# Patient Record
Sex: FEMALE | Race: WHITE | NOT HISPANIC OR LATINO | Employment: OTHER | URBAN - METROPOLITAN AREA
[De-identification: names, ages, dates, MRNs, and addresses within clinical notes are randomized per-mention and may not be internally consistent; named-entity substitution may affect disease eponyms.]

---

## 2021-06-23 ENCOUNTER — OFFICE VISIT (OUTPATIENT)
Dept: ORTHOPEDIC SURGERY | Facility: CLINIC | Age: 56
End: 2021-06-23

## 2021-06-23 VITALS
HEIGHT: 68 IN | SYSTOLIC BLOOD PRESSURE: 134 MMHG | WEIGHT: 203.2 LBS | BODY MASS INDEX: 30.8 KG/M2 | HEART RATE: 64 BPM | DIASTOLIC BLOOD PRESSURE: 88 MMHG

## 2021-06-23 DIAGNOSIS — I87.8 VENOUS STASIS: ICD-10-CM

## 2021-06-23 DIAGNOSIS — M25.571 RIGHT ANKLE PAIN, UNSPECIFIED CHRONICITY: Primary | ICD-10-CM

## 2021-06-23 PROCEDURE — 99203 OFFICE O/P NEW LOW 30 MIN: CPT | Performed by: ORTHOPAEDIC SURGERY

## 2021-06-23 RX ORDER — CITALOPRAM 20 MG/1
20 TABLET ORAL DAILY
COMMUNITY
Start: 2021-06-10

## 2021-06-23 RX ORDER — MULTIPLE VITAMINS W/ MINERALS TAB 9MG-400MCG
TAB ORAL
COMMUNITY

## 2021-06-23 RX ORDER — BISOPROLOL FUMARATE AND HYDROCHLOROTHIAZIDE 10; 6.25 MG/1; MG/1
1 TABLET ORAL DAILY
COMMUNITY
Start: 2021-06-10

## 2021-06-23 RX ORDER — PHENOL 1.4 %
10 AEROSOL, SPRAY (ML) MUCOUS MEMBRANE DAILY
COMMUNITY

## 2021-06-23 RX ORDER — ALENDRONATE SODIUM 70 MG/1
TABLET ORAL
COMMUNITY

## 2021-06-23 RX ORDER — LEVOTHYROXINE SODIUM 137 UG/1
TABLET ORAL
COMMUNITY

## 2021-06-23 RX ORDER — SELENIUM 50 MCG
TABLET ORAL
COMMUNITY

## 2021-06-23 NOTE — PROGRESS NOTES
NEW PATIENT    Patient: Megan Morel  : 1965    Primary Care Provider: Mehrdad Menezes MD    Requesting Provider: As above    Pain of the Right Ankle      History    Chief Complaint: Right ankle pain    History of Present Illness: This is a pleasant 56-year-old woman here today with her .  In late 2020 she had an inversion injury to the right ankle.  There is an x-ray report in the system but no films dated 2020, small avulsion fracture at the distal aspect of the fibula.  She was seen by podiatry.  She was placed in a boot.  She was followed and noted that the small fragment never healed with solid bone.  She was given a bone stimulator.  She had improvement in her pain but never completely resolved.  She was evaluated for osteoporosis.  She is now on medication.  At her last visit surgery was recommended.  I reviewed the copious outside records.  Plating and bone graft from the calcaneus were recommended.  She has some of the prior x-rays and pictures on her phone, they show a very thin very distal avulsion fracture at the tip of the fibula.  She reports that she asked for physical therapy recently and has started.  She reports much less pain than the initial injury.  She is walking and rates the pain as 4 out of 10.  She has a history of some difficulty with balance.  She reports physical therapy is helping.    Current Outpatient Medications on File Prior to Visit   Medication Sig Dispense Refill   • alendronate (FOSAMAX) 70 MG tablet alendronate 70 mg tablet   TAKE 1 TABLET BY MOUTH 1 TIME A WEEK 30 MINUTES BEFORE THE FIRST FOOD OR BEVERAGE OR MEDICINE OF THE DAY WITH WATER     • AMLODIPINE BESYLATE PO Take 5 mg by mouth.     • B Complex Vitamins (VITAMIN-B COMPLEX PO) Take 1 tablet by mouth Daily.     • bisoprolol-hydrochlorothiazide (ZIAC) 10-6.25 MG per tablet Take 1 tablet by mouth Daily.     • calcium carb-cholecalciferol (Caltrate 600+D3) 600-800 MG-UNIT tablet      •  citalopram (CeleXA) 20 MG tablet Take 20 mg by mouth Daily.     • Lactobacillus (Acidophilus) capsule Acidophilus     • levothyroxine (SYNTHROID, LEVOTHROID) 137 MCG tablet levothyroxine 137 mcg tablet   TAKE 1 TABLET BY MOUTH EVERY MORNING     • Melatonin 10 MG tablet Take 10 mg by mouth Daily.     • multivitamin with minerals (MULTIVITAMIN ADULTS PO) multivitamin       No current facility-administered medications on file prior to visit.      Allergies   Allergen Reactions   • Penicillins Hives      Past Medical History:   Diagnosis Date   • Asthma    • Bronchitis    • Headache, migraine    • High blood pressure    • Hx of bladder infections    • Osteoporosis    • Pneumonia    • Skull fractures (CMS/HCC)    • TBI (traumatic brain injury) (CMS/HCC)    • Thyroid disease      Past Surgical History:   Procedure Laterality Date   • OTHER SURGICAL HISTORY      blood transfusion-TBI   • THYROID BIOPSY      06/21/2018     Family History   Problem Relation Age of Onset   • Arthritis Mother    • Asthma Mother    • Cancer Mother    • Hypertension Mother    • Thyroid disease Mother    • Asthma Father    • Hypertension Father    • Stroke Father    • Thyroid disease Father    • Thyroid disease Brother    • Cancer Maternal Grandmother       Social History     Socioeconomic History   • Marital status:      Spouse name: Not on file   • Number of children: Not on file   • Years of education: Not on file   • Highest education level: Not on file   Tobacco Use   • Smoking status: Never Smoker   • Smokeless tobacco: Never Used   Substance and Sexual Activity   • Alcohol use: Never   • Drug use: Never   • Sexual activity: Defer        Review of Systems   Constitutional: Positive for activity change and fatigue.   Eyes: Negative.    Respiratory: Positive for chest tightness.    Cardiovascular: Negative.    Gastrointestinal: Negative.    Endocrine: Negative.    Genitourinary: Negative.    Musculoskeletal: Positive for arthralgias,  "neck pain and neck stiffness.   Skin: Negative.    Allergic/Immunologic: Positive for environmental allergies.   Neurological: Positive for numbness and headaches.   Hematological: Negative.    Psychiatric/Behavioral: Negative.        The following portions of the patient's history were reviewed and updated as appropriate: allergies, current medications, past family history, past medical history, past social history, past surgical history and problem list.    Physical Exam:   /88   Pulse 64   Ht 172.7 cm (67.99\")   Wt 92.2 kg (203 lb 3.2 oz)   BMI 30.90 kg/m²   GENERAL: Body habitus: obese    Lower extremity edema: Right: 1+ pitting; Left: 1+ pitting    Varicose veins:  Right: mild; Left: mild    Gait: normal     Mental Status:  awake and alert; oriented to person, place, and time    Voice:  clear  SKIN:  Lower extremity: Normal    Hair Growth(lower extremity):  Right:normal; Left:  normal  NAILS: Toenails: normal  HEENT: Head: Normocephalic, atraumatic,  without obvious abnormality.  eye: normal external eye, no icterus  ears:normal external ears  PULM:  Repiratory effort normal  CV:  Dorsalis Pedis:  Right: 2+; Left:2+    Posterior Tibial: Right:2+; Left:2+    Capillary Refill:  Brisk  MSK:  Hand:right handed      Tibia:  Right:  non tender; Left:  non tender      Ankle:  Right: non tender, ROM  normal and motor function  normal; Left:  Very slightly tender at the tip of the fibula, tender over the ATFL, otherwise nontender, range of motion is normal, no joint effusion, no instability to anterior drawer      Foot:  Right:  non tender, normal range of motion; Left:  non tender, normal range of motion      NEURO: Heel Walking:  Right:  normal and A little unsteady; Left:  normal and Somewhat unsteady    Toe Walking:  Right:  able to get up on toes, difficulty with balance; Left:  able to get up on toes, difficulty with balance     Melcher [place] 5.07 monofilament test: normal    Lower extremity " sensation: intact     Reflexes:  Biceps:  Right:  not tested; Left:  not tested           Quads:  Right:  not tested; Left:  not tested           Ankle:  Right:  not tested; Left:  not tested      Calf Atrophy:Mild left calf atrophy    Motor Function: all 5/5         Medical Decision Making    Data Review:   ordered and reviewed x-rays today, reviewed radiology images, reviewed radiology results and reviewed outside records    Assessment and Plan/ Diagnosis/Treatment options:   1. Right ankle pain, unspecified chronicity  She has now a small smooth edged ossicle at the tip of the fibula consistent with the initial injury healing with fibrous tissue.  I explained that this is generally considered a normal outcome.  Many of the small avulsion fractures heal with fibrous tissue rather than solid bone.  This is not a Lemon a fracture.  This is essentially a significant ankle sprain.  I explained that when we sprained her ankle we either tear of the ligament or the ligament pulls off a small piece of bone.  It is extremely rare that this would ever need surgery.  If it did come to surgery bone grafting and plating would be completely ineffective.  One would need to remove the small bony ossicle and reattach the ligament with suture anchors.  However in general it is unnecessary to perform any type of surgery for this problem.  The vast majority of patients get better with physical therapy.  That is what I would recommend.  She has already started therapy and I will put in a new prescription indicating proprioceptive training.  She may discontinue the boot.  I will see her when therapy is complete.  No x-ray needed at that time.  - XR Ankle 2 View Right  - Ambulatory Referral to Physical Therapy    2. Venous stasis  .I explained edema and venous stasis to the patient, and the often hereditary nature of the problem, and the contribution of weight, trauma, etc. I explained how they cause edema (due to gravity, etc) I  explained how they can lead to ulceration.  I explained how they can cause pain, stiffness, aching, cramping, etc. I explained that it is a very very common problem, so common that even [employer] markets compression stockings.  I recommend wearing support stockings (compression stockings) daily, we discussed what type and where to find them                  Radiology Ordered []  Radiology Reports Reviewed []      Radiology Images Reviewed []   Labs Reviewed []    Labs Ordered []   PCP Records Reviewed []    Provider Records Reviewed []    ER Records Reviewed []    Hospital Records Reviewed []    History Obtained From Family []    Phone conversation with Provider []    Records Requested []        Ruthie Calixto MD

## 2021-09-27 ENCOUNTER — OFFICE VISIT (OUTPATIENT)
Dept: ORTHOPEDIC SURGERY | Facility: CLINIC | Age: 56
End: 2021-09-27

## 2021-09-27 VITALS
DIASTOLIC BLOOD PRESSURE: 90 MMHG | SYSTOLIC BLOOD PRESSURE: 164 MMHG | HEIGHT: 68 IN | WEIGHT: 202.6 LBS | BODY MASS INDEX: 30.71 KG/M2 | HEART RATE: 66 BPM

## 2021-09-27 DIAGNOSIS — M25.571 RIGHT ANKLE PAIN, UNSPECIFIED CHRONICITY: Primary | ICD-10-CM

## 2021-09-27 DIAGNOSIS — I87.8 VENOUS STASIS: ICD-10-CM

## 2021-09-27 PROCEDURE — 99213 OFFICE O/P EST LOW 20 MIN: CPT | Performed by: ORTHOPAEDIC SURGERY

## 2021-09-27 RX ORDER — CHOLECALCIFEROL (VITAMIN D3) 1250 MCG
CAPSULE ORAL
COMMUNITY
Start: 2010-06-01

## 2021-09-27 NOTE — PROGRESS NOTES
ESTABLISHED PATIENT    Patient: Megan Morel  : 1965    Primary Care Provider: Mehrdad Menezes MD    Requesting Provider: As above    Follow-up (3 months- Right ankle pain, unspecified chronicity )      History    Chief Complaint: Right ankle pain    History of Present Illness: She returns for follow-up of the small transverse avulsion at the tip of the right fibula.  She has been doing PT and is much improved.  They did proprioception.  She reports the pain is significantly less.  She is able to do more activities.  She brought a disc with her previous x-rays on it, I looked at the films dated 2021, 2021, 3/27/2021, 2021, and 2021.  They all show the small transverse tip of fibula avulsion fracture.  No widening of the mortise.  No other fractures.    Current Outpatient Medications on File Prior to Visit   Medication Sig Dispense Refill   • alendronate (FOSAMAX) 70 MG tablet alendronate 70 mg tablet   TAKE 1 TABLET BY MOUTH 1 TIME A WEEK 30 MINUTES BEFORE THE FIRST FOOD OR BEVERAGE OR MEDICINE OF THE DAY WITH WATER     • AMLODIPINE BESYLATE PO Take 5 mg by mouth.     • B Complex Vitamins (VITAMIN-B COMPLEX PO) Take 1 tablet by mouth Daily.     • bisoprolol-hydrochlorothiazide (ZIAC) 10-6.25 MG per tablet Take 1 tablet by mouth Daily.     • calcium carb-cholecalciferol (Caltrate 600+D3) 600-800 MG-UNIT tablet      • Cholecalciferol (Vitamin D3) 1.25 MG (07661 UT) capsule      • citalopram (CeleXA) 20 MG tablet Take 20 mg by mouth Daily.     • Lactobacillus (Acidophilus) capsule Acidophilus     • levothyroxine (SYNTHROID, LEVOTHROID) 137 MCG tablet levothyroxine 137 mcg tablet   TAKE 1 TABLET BY MOUTH EVERY MORNING     • Melatonin 10 MG tablet Take 10 mg by mouth Daily.     • multivitamin with minerals (MULTIVITAMIN ADULTS PO) multivitamin       No current facility-administered medications on file prior to visit.      Allergies   Allergen Reactions   • Penicillins Hives      Past  "Medical History:   Diagnosis Date   • Asthma    • Bronchitis    • Headache, migraine    • High blood pressure    • Hx of bladder infections    • Osteoporosis    • Pneumonia    • Skull fractures (CMS/HCC)    • TBI (traumatic brain injury) (CMS/HCC)    • Thyroid disease      Past Surgical History:   Procedure Laterality Date   • OTHER SURGICAL HISTORY      blood transfusion-TBI   • THYROID BIOPSY      06/21/2018     Family History   Problem Relation Age of Onset   • Arthritis Mother    • Asthma Mother    • Cancer Mother    • Hypertension Mother    • Thyroid disease Mother    • Asthma Father    • Hypertension Father    • Stroke Father    • Thyroid disease Father    • Thyroid disease Brother    • Cancer Maternal Grandmother       Social History     Socioeconomic History   • Marital status:      Spouse name: Not on file   • Number of children: Not on file   • Years of education: Not on file   • Highest education level: Not on file   Tobacco Use   • Smoking status: Never Smoker   • Smokeless tobacco: Never Used   Substance and Sexual Activity   • Alcohol use: Never   • Drug use: Never   • Sexual activity: Defer        Review of Systems   Constitutional: Negative.    HENT: Negative.    Eyes: Negative.    Respiratory: Negative.    Cardiovascular: Negative.    Gastrointestinal: Negative.    Endocrine: Negative.    Genitourinary: Negative.    Musculoskeletal: Positive for arthralgias.   Skin: Negative.    Allergic/Immunologic: Negative.    Neurological: Negative.    Hematological: Negative.    Psychiatric/Behavioral: Negative.        The following portions of the patient's history were reviewed and updated as appropriate: allergies, current medications, past family history, past medical history, past social history, past surgical history and problem list.    Physical Exam:   /90   Pulse 66   Ht 172.7 cm (67.99\")   Wt 91.9 kg (202 lb 9.6 oz)   BMI 30.81 kg/m²   GENERAL: Body habitus: obese    Lower extremity " edema: Left: trace; Right: trace    Gait: normal     Mental Status:  awake and alert; oriented to person, place, and time  MSK:  Tibia:  Right:  non tender; Left:  non tender        Ankle:  Right: Mildly tender over the ATFL but not tender over the fibula, ankle is stable, no tenderness, slight thickening around the right ankle compared with the left range of motion normal and symmetric; Left:  non tender        Foot:  Right:  non tender; Left:  non tender    NEURO Sensation:  intact    Medical Decision Making    Data Review:   reviewed radiology images, reviewed radiology results and reviewed outside records    Assessment/Plan/Diagnosis/Treatment Options:   1. Right ankle pain, unspecified chronicity  She is much improved.  I explained again that I would expect this fracture to be visible on x-ray forever.  It is very rare to ever need surgery.  With her symptoms I would not recommend any surgical intervention.  I explained that if it did come to surgery I would not try to bone graft and fix the fracture because it so tiny.  If it did come to surgery I would simply remove it and reattach the ligaments with suture anchors.  In any event she is much improved and I would not recommend surgery.  She should continue her exercises on her own and slowly increase her activity.  I will see her back in 3 months with 2 views of the ankle for possible final follow-up.    2. Venous stasis  Should continue to wear compression socks        Ruthie Calixto MD

## 2021-12-24 ENCOUNTER — HOSPITAL ENCOUNTER (EMERGENCY)
Age: 56
Discharge: HOME | End: 2021-12-24
Payer: COMMERCIAL

## 2021-12-24 VITALS
SYSTOLIC BLOOD PRESSURE: 124 MMHG | HEART RATE: 82 BPM | DIASTOLIC BLOOD PRESSURE: 72 MMHG | TEMPERATURE: 98.42 F | RESPIRATION RATE: 18 BRPM

## 2021-12-24 VITALS
OXYGEN SATURATION: 95 % | TEMPERATURE: 98.42 F | RESPIRATION RATE: 18 BRPM | DIASTOLIC BLOOD PRESSURE: 72 MMHG | HEART RATE: 82 BPM | SYSTOLIC BLOOD PRESSURE: 124 MMHG

## 2021-12-24 VITALS — BODY MASS INDEX: 31.6 KG/M2

## 2021-12-24 DIAGNOSIS — J01.90: ICD-10-CM

## 2021-12-24 DIAGNOSIS — U07.1: Primary | ICD-10-CM

## 2021-12-24 PROCEDURE — 99202 OFFICE O/P NEW SF 15 MIN: CPT

## 2021-12-24 PROCEDURE — G0463 HOSPITAL OUTPT CLINIC VISIT: HCPCS

## 2021-12-27 ENCOUNTER — TELEPHONE (OUTPATIENT)
Dept: ORTHOPEDIC SURGERY | Facility: CLINIC | Age: 56
End: 2021-12-27

## 2021-12-27 NOTE — TELEPHONE ENCOUNTER
Called patient to confirm the cancellation of today's appointment an to reschedule for an Feb or March appointment

## 2021-12-28 ENCOUNTER — HOSPITAL ENCOUNTER (OUTPATIENT)
Age: 56
End: 2021-12-28
Payer: COMMERCIAL

## 2021-12-28 VITALS
HEART RATE: 71 BPM | RESPIRATION RATE: 18 BRPM | DIASTOLIC BLOOD PRESSURE: 67 MMHG | SYSTOLIC BLOOD PRESSURE: 112 MMHG | OXYGEN SATURATION: 95 %

## 2021-12-28 VITALS
OXYGEN SATURATION: 98 % | DIASTOLIC BLOOD PRESSURE: 75 MMHG | SYSTOLIC BLOOD PRESSURE: 113 MMHG | RESPIRATION RATE: 18 BRPM | HEART RATE: 66 BPM

## 2021-12-28 VITALS
RESPIRATION RATE: 18 BRPM | HEART RATE: 70 BPM | DIASTOLIC BLOOD PRESSURE: 67 MMHG | OXYGEN SATURATION: 95 % | SYSTOLIC BLOOD PRESSURE: 109 MMHG

## 2021-12-28 VITALS
HEART RATE: 83 BPM | SYSTOLIC BLOOD PRESSURE: 107 MMHG | OXYGEN SATURATION: 95 % | DIASTOLIC BLOOD PRESSURE: 53 MMHG | RESPIRATION RATE: 18 BRPM

## 2021-12-28 VITALS
DIASTOLIC BLOOD PRESSURE: 62 MMHG | HEART RATE: 72 BPM | SYSTOLIC BLOOD PRESSURE: 110 MMHG | RESPIRATION RATE: 18 BRPM | OXYGEN SATURATION: 92 %

## 2021-12-28 VITALS
DIASTOLIC BLOOD PRESSURE: 68 MMHG | HEART RATE: 72 BPM | RESPIRATION RATE: 18 BRPM | OXYGEN SATURATION: 94 % | SYSTOLIC BLOOD PRESSURE: 110 MMHG

## 2021-12-28 VITALS
HEART RATE: 81 BPM | DIASTOLIC BLOOD PRESSURE: 82 MMHG | OXYGEN SATURATION: 97 % | SYSTOLIC BLOOD PRESSURE: 115 MMHG | RESPIRATION RATE: 18 BRPM

## 2021-12-28 DIAGNOSIS — Z23: ICD-10-CM

## 2021-12-28 DIAGNOSIS — U07.1: Primary | ICD-10-CM

## 2021-12-28 PROCEDURE — 96365 THER/PROPH/DIAG IV INF INIT: CPT

## 2022-01-31 ENCOUNTER — OFFICE VISIT (OUTPATIENT)
Dept: ORTHOPEDIC SURGERY | Facility: CLINIC | Age: 57
End: 2022-01-31

## 2022-01-31 VITALS
DIASTOLIC BLOOD PRESSURE: 82 MMHG | BODY MASS INDEX: 31.95 KG/M2 | SYSTOLIC BLOOD PRESSURE: 135 MMHG | HEIGHT: 68 IN | WEIGHT: 210.8 LBS

## 2022-01-31 DIAGNOSIS — I87.8 VENOUS STASIS: ICD-10-CM

## 2022-01-31 DIAGNOSIS — M25.571 RIGHT ANKLE PAIN, UNSPECIFIED CHRONICITY: Primary | ICD-10-CM

## 2022-01-31 PROCEDURE — 99213 OFFICE O/P EST LOW 20 MIN: CPT | Performed by: ORTHOPAEDIC SURGERY

## 2022-01-31 RX ORDER — AMLODIPINE BESYLATE 5 MG/1
5 TABLET ORAL DAILY
COMMUNITY
Start: 2021-12-12

## 2022-01-31 RX ORDER — ACYCLOVIR 200 MG/1
200 CAPSULE ORAL 2 TIMES DAILY
COMMUNITY
Start: 2021-12-31

## 2022-01-31 NOTE — PROGRESS NOTES
ESTABLISHED PATIENT    Patient: Megan Morel  : 1965    Primary Care Provider: Mehrdad Menezes MD    Requesting Provider: As above    Follow-up (4 month f/u Right ankle pain)      History    Chief Complaint: Right ankle problem    History of Present Illness: She returns for follow-up of the right distal fibula avulsion type fracture.  She reports pain has completely resolved.  She reports occasionally feels a little weak.  She is wearing her compression stockings.  She finds them very helpful.    Current Outpatient Medications on File Prior to Visit   Medication Sig Dispense Refill   • acyclovir (ZOVIRAX) 200 MG capsule Take 200 mg by mouth 2 (Two) Times a Day.     • alendronate (FOSAMAX) 70 MG tablet alendronate 70 mg tablet   TAKE 1 TABLET BY MOUTH 1 TIME A WEEK 30 MINUTES BEFORE THE FIRST FOOD OR BEVERAGE OR MEDICINE OF THE DAY WITH WATER     • alendronate-cholecalciferol (FOSAMAX PLUS D)  MG-UNIT per tablet Take 1 tablet by mouth.     • amLODIPine (NORVASC) 5 MG tablet Take 5 mg by mouth Daily.     • B Complex Vitamins (VITAMIN-B COMPLEX PO) Take 1 tablet by mouth Daily.     • bisoprolol-hydrochlorothiazide (ZIAC) 10-6.25 MG per tablet Take 1 tablet by mouth Daily.     • calcium carb-cholecalciferol (Caltrate 600+D3) 600-800 MG-UNIT tablet      • Cholecalciferol (Vitamin D3) 1.25 MG (95212 UT) capsule      • citalopram (CeleXA) 20 MG tablet Take 20 mg by mouth Daily.     • Lactobacillus (Acidophilus) capsule Acidophilus     • levothyroxine (SYNTHROID, LEVOTHROID) 137 MCG tablet levothyroxine 137 mcg tablet   TAKE 1 TABLET BY MOUTH EVERY MORNING     • Melatonin 10 MG tablet Take 10 mg by mouth Daily.     • multivitamin with minerals (MULTIVITAMIN ADULTS PO) multivitamin     • [DISCONTINUED] AMLODIPINE BESYLATE PO Take 5 mg by mouth.       No current facility-administered medications on file prior to visit.      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   • Osteoporosis    • Pneumonia    • Skull fractures ([facility])    • TBI (traumatic brain injury) ([facility])    • Tennis elbow     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   • Thyroid disease    • Wrist sprain     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     Past Surgical History:   Procedure Laterality Date   • OTHER SURGICAL HISTORY      blood transfusion-TBI   • THYROID BIOPSY      06/21/2018     Family History   Problem Relation Age of Onset   • Arthritis Mother    • Asthma Mother    • Cancer Mother         Breast cancer   • Hypertension Mother    • Thyroid disease Mother    • Osteoporosis Mother    • Asthma Father    • Hypertension Father    • Stroke Father    • Thyroid disease Father    • Osteoporosis Father    • Thyroid disease Brother    • Cancer Maternal Grandmother         Breast cancer   • Cancer Maternal Aunt         Breast cancer   • Osteoporosis Paternal Grandmother       Social History     Socioeconomic History   • Marital status:    Tobacco Use   • Smoking status: Never Smoker   • Smokeless tobacco: Never Used   Substance and Sexual Activity   • Alcohol use: Not Currently     Alcohol/week: 0.0 standard drinks     Comment: Social events   • Drug use: Never   • Sexual activity: Not Currently     Partners: Male     Birth control/protection: Abstinence, Post-menopausal, Other     Comment: Took Birth Control Pill [age] years        Review of Systems   Constitutional: Negative.    HENT: Negative.    Eyes: Negative.    Respiratory: Negative.    Cardiovascular: Negative.    Gastrointestinal: Negative.    Endocrine:  "Negative.    Genitourinary: Negative.    Musculoskeletal: Positive for arthralgias.   Skin: Negative.    Allergic/Immunologic: Negative.    Neurological: Negative.    Hematological: Negative.    Psychiatric/Behavioral: Negative.        The following portions of the patient's history were reviewed and updated as appropriate: allergies, current medications, past family history, past medical history, past social history, past surgical history and problem list.    Physical Exam:   /82   Ht 172.7 cm (67.99\")   Wt 95.6 kg (210 lb 12.8 oz)   BMI 32.06 kg/m²   GENERAL: Body habitus: obese    Lower extremity edema: Left: trace; Right: trace    Gait: normal     Mental Status:  awake and alert; oriented to person, place, and time  MSK:  Tibia:  Right:  non tender; Left:  non tender        Ankle:  Right: non tender; Left:  non tender        Foot:  Right:  non tender; Left:  non tender    NEURO Sensation:  intact    Medical Decision Making    Data Review:   ordered and reviewed x-rays today    Assessment/Plan/Diagnosis/Treatment Options:   1. Right ankle pain, unspecified chronicity  Her pain from the small avulsion fracture has completely resolved.  I explained that this healed with a fibrous bond which is the most common way.  I explained it will not move around, its not likely to cause any long-term problems.  I would not recommend any surgery.  I will be happy to see her anytime  - XR Ankle 2 View Right    2. Venous stasis  She finds the compression stockings very comfortable        Ruthie Calixto MD                      "